# Patient Record
Sex: MALE
[De-identification: names, ages, dates, MRNs, and addresses within clinical notes are randomized per-mention and may not be internally consistent; named-entity substitution may affect disease eponyms.]

---

## 2017-03-18 ENCOUNTER — HOSPITAL ENCOUNTER (EMERGENCY)
Dept: HOSPITAL 14 - H.ER | Age: 67
LOS: 1 days | Discharge: HOME | End: 2017-03-19
Payer: COMMERCIAL

## 2017-03-18 VITALS — TEMPERATURE: 98.2 F | RESPIRATION RATE: 16 BRPM

## 2017-03-18 DIAGNOSIS — E78.5: ICD-10-CM

## 2017-03-18 DIAGNOSIS — R42: Primary | ICD-10-CM

## 2017-03-18 DIAGNOSIS — I10: ICD-10-CM

## 2017-03-18 DIAGNOSIS — K57.92: ICD-10-CM

## 2017-03-18 NOTE — ED PDOC
Syncope/Near Syncope/Dizzyness


Time Seen by Provider: 03/18/17 23:16


Chief Complaint (Nursing): Dizziness/Lightheaded


Chief Complaint (Provider): Dizzy


History Per: Patient


Additional Complaint(s): 


67 yo male, PMH of HTN, High Cholesterol and Diverticulitis, presents to ED 

with complaints of Dizziness since 7 pm. Pt reports that when he gets up to 

walk he feels off balance. 


no headache, no tinnitus, no visual changes, no nausea or vomiting, SOB or 

chest pain. 





Ambulatory with steady gait into ED room.





Past Medical History


Reviewed: Nursing Documentation, Vital Signs


Vital Signs: 





 Last Vital Signs











Temp  98.2 F   03/18/17 23:12


 


Pulse  68   03/18/17 23:12


 


Resp  16   03/18/17 23:12


 


BP  137/88   03/18/17 23:12


 


Pulse Ox  95   03/18/17 23:12














- Medical History


PMH: Diverticulitis, Gall Bladder Disease, HTN, Hyperlipidemia


   Denies: Chronic Kidney Disease





- Surgical History


Surgical History: Cholecystectomy





- Family History


Family History: States: Unknown Family Hx





- Living Arrangements


Living Arrangements: With Family





- Home Medications


Home Medications: 


 Ambulatory Orders











 Medication  Instructions  Recorded


 


Aspirin [Aspirin Chewable] 1 tab PO DAILY 03/19/17


 


Losartan Potassium [Cozaar] 1 tab PO DAILY 03/19/17


 


Meclizine [Meclizine*] 25 mg PO Q6 #30 tab 03/19/17


 


Simvastatin [Simvastatin] 100 mg PO DAILY 03/19/17














- Allergies


Allergies/Adverse Reactions: 


 Allergies











Allergy/AdvReac Type Severity Reaction Status Date / Time


 


No Known Allergies Allergy   Verified 08/27/14 23:17














Review of Systems


ROS Statement: Except As Marked, All Systems Reviewed And Found Negative


Neurological: Positive for: Dizziness





Physical Exam





- Reviewed


Nursing Documentation Reviewed: Yes


Vital Signs Reviewed: Yes





- Physical Exam


Appears: Positive for: Well, Non-toxic, No Acute Distress


Head Exam: Positive for: ATRAUMATIC, NORMAL INSPECTION, NORMOCEPHALIC


Skin: Positive for: Normal Color, Warm, DRY


Eye Exam: Positive for: EOMI, Normal appearance, PERRL


ENT: Positive for: Normal ENT Inspection


Neck: Positive for: Normal, Painless ROM


Cardiovascular/Chest: Positive for: Regular Rate, Rhythm


Respiratory: Positive for: CNT, Normal Breath Sounds


Gastrointestinal/Abdominal: Positive for: Normal Exam, Bowel Sounds, Soft


Back: Positive for: Normal Inspection


Extremity: Positive for: Normal ROM


Neurologic/Psych: Positive for: Alert, Oriented





- Laboratory Results


Result Diagrams: 


 03/18/17 00:11





 03/18/17 00:11





- ECG


O2 Sat by Pulse Oximetry: 95





Medical Decision Making


Medical Decision Making: 











EKG: NSR at 69 bpm, no acute ST Changes, as read by ED MD








CXR: NAD, as read by PAAylinC











Head CT IMPRESSION:


Normal head/brain CT.








CBC and COMP resulted WNL





Pt doing well on re-eval, no complaints of dizziness. Given RX for meclizine to 

use as needed and as directed





Disposition





- Clinical Impression


Clinical Impression: 


 Dizziness





- Patient ED Disposition


Is Patient to be Admitted: No





- Disposition


Disposition: Routine/Home


Disposition Time: 02:00


Condition: GOOD


Prescriptions: 


Meclizine [Meclizine*] 25 mg PO Q6 #30 tab


Instructions:  Dizziness (ED)

## 2017-03-19 VITALS — HEART RATE: 61 BPM | SYSTOLIC BLOOD PRESSURE: 125 MMHG | DIASTOLIC BLOOD PRESSURE: 85 MMHG

## 2017-03-19 LAB
ALBUMIN/GLOB SERPL: 1.2 {RATIO} (ref 1–2.1)
ALP SERPL-CCNC: 71 U/L (ref 38–126)
ALT SERPL-CCNC: 46 U/L (ref 21–72)
AST SERPL-CCNC: 34 U/L (ref 17–59)
BASOPHILS # BLD AUTO: 0.1 K/UL (ref 0–0.2)
BASOPHILS NFR BLD: 0.9 % (ref 0–2)
BILIRUB SERPL-MCNC: 0.7 MG/DL (ref 0.2–1.3)
BUN SERPL-MCNC: 16 MG/DL (ref 9–20)
CALCIUM SERPL-MCNC: 9 MG/DL (ref 8.4–10.2)
CHLORIDE SERPL-SCNC: 105 MMOL/L (ref 98–107)
CO2 SERPL-SCNC: 24 MMOL/L (ref 22–30)
EOSINOPHIL # BLD AUTO: 0.3 K/UL (ref 0–0.7)
EOSINOPHIL NFR BLD: 4.4 % (ref 0–4)
ERYTHROCYTE [DISTWIDTH] IN BLOOD BY AUTOMATED COUNT: 14.2 % (ref 11.5–14.5)
GLOBULIN SER-MCNC: 3.4 GM/DL (ref 2.2–3.9)
GLUCOSE SERPL-MCNC: 108 MG/DL (ref 75–110)
HCT VFR BLD CALC: 45.2 % (ref 35–51)
LYMPHOCYTES # BLD AUTO: 3.2 K/UL (ref 1–4.3)
LYMPHOCYTES NFR BLD AUTO: 51 % (ref 20–40)
MCH RBC QN AUTO: 27.8 PG (ref 27–31)
MCHC RBC AUTO-ENTMCNC: 32.8 G/DL (ref 33–37)
MCV RBC AUTO: 84.6 FL (ref 80–94)
MONOCYTES # BLD: 0.7 K/UL (ref 0–0.8)
MONOCYTES NFR BLD: 11.9 % (ref 0–10)
NEUTROPHILS # BLD: 2 K/UL (ref 1.8–7)
NEUTROPHILS NFR BLD AUTO: 31.8 % (ref 50–75)
NRBC BLD AUTO-RTO: 0.1 % (ref 0–0)
PLATELET # BLD: 192 K/UL (ref 130–400)
PMV BLD AUTO: 8.8 FL (ref 7.2–11.7)
POTASSIUM SERPL-SCNC: 4.2 MMOL/L (ref 3.6–5)
PROT SERPL-MCNC: 7.4 G/DL (ref 6.3–8.2)
SODIUM SERPL-SCNC: 136 MMOL/L (ref 132–148)
WBC # BLD AUTO: 6.2 K/UL (ref 4.8–10.8)

## 2017-03-19 NOTE — CT
EXAM:

  CT Head Without Intravenous Contrast.



CLINICAL HISTORY:

  66 years old, male; Pain; Headache; Other: Ha's dizzy; Patient HX: Ybarra's 

dizzy. HTN. High cholesterol; Additional info: Dizzy, off balance



TECHNIQUE:

  Axial computed tomography images of the head/brain without intravenous 

contrast.  This CT exam was performed using one or more of the following dose 

reduction techniques:  automated exposure control, adjustment of the mA and/or 

kV according to patient size, and/or use of iterative reconstruction technique.

  Coronal and sagittal reformatted images were created and reviewed.



COMPARISON:

  No relevant prior studies available.



FINDINGS:

  Brain:  Unremarkable.  No hemorrhage.  No significant white matter disease.  

No edema.

  Ventricles:  Unremarkable.  No ventriculomegaly.

  Bones/joints:  Unremarkable.  No acute fracture.

  Soft tissues:  Unremarkable.

  Sinuses:  Unremarkable as visualized.  No acute sinusitis.

  Mastoid air cells:  Unremarkable as visualized.  No mastoid effusion.



IMPRESSION:     

  Normal head/brain CT.

## 2017-03-19 NOTE — RAD
HISTORY:

med screening  



COMPARISON:

05/12/2014 



FINDINGS:



LUNGS:

No active pulmonary disease.



PLEURA:

No significant pleural effusion identified, no pneumothorax apparent.



CARDIOVASCULAR:

Normal.



OSSEOUS STRUCTURES:

No significant abnormalities.



VISUALIZED UPPER ABDOMEN:

Normal.



OTHER FINDINGS:

None.



IMPRESSION:

No active disease.

## 2017-03-20 NOTE — CARD
--------------- APPROVED REPORT --------------





EKG Measurement

Heart [id number]

RI [id number]

[id number]

[id number]

[id number]



<Conclusion>

Normal sinus rhythm

Minimal voltage criteria for LVH, may be normal variant

Borderline ECG

## 2017-04-02 VITALS — OXYGEN SATURATION: 95 %

## 2018-10-03 ENCOUNTER — HOSPITAL ENCOUNTER (EMERGENCY)
Dept: HOSPITAL 14 - H.ER | Age: 68
Discharge: HOME | End: 2018-10-03
Payer: COMMERCIAL

## 2018-10-03 VITALS
RESPIRATION RATE: 15 BRPM | OXYGEN SATURATION: 100 % | HEART RATE: 70 BPM | DIASTOLIC BLOOD PRESSURE: 87 MMHG | SYSTOLIC BLOOD PRESSURE: 139 MMHG

## 2018-10-03 VITALS — BODY MASS INDEX: 29 KG/M2

## 2018-10-03 VITALS — TEMPERATURE: 98.3 F

## 2018-10-03 DIAGNOSIS — I10: Primary | ICD-10-CM

## 2018-10-03 DIAGNOSIS — E78.5: ICD-10-CM

## 2018-10-03 LAB
ALBUMIN SERPL-MCNC: 4.1 G/DL (ref 3.5–5)
ALBUMIN/GLOB SERPL: 1.1 {RATIO} (ref 1–2.1)
ALT SERPL-CCNC: 50 U/L (ref 21–72)
APTT BLD: 35.7 SECONDS (ref 25.6–37.1)
AST SERPL-CCNC: 40 U/L (ref 17–59)
BASOPHILS # BLD AUTO: 0 K/UL (ref 0–0.2)
BASOPHILS NFR BLD: 0.4 % (ref 0–2)
BILIRUB UR-MCNC: NEGATIVE MG/DL
BUN SERPL-MCNC: 19 MG/DL (ref 9–20)
CALCIUM SERPL-MCNC: 9.4 MG/DL (ref 8.4–10.2)
COLOR UR: YELLOW
EOSINOPHIL # BLD AUTO: 0.2 K/UL (ref 0–0.7)
EOSINOPHIL NFR BLD: 2.7 % (ref 0–4)
ERYTHROCYTE [DISTWIDTH] IN BLOOD BY AUTOMATED COUNT: 14 % (ref 11.5–14.5)
GFR NON-AFRICAN AMERICAN: 47
GLUCOSE UR STRIP-MCNC: (no result) MG/DL
HGB BLD-MCNC: 14.7 G/DL (ref 12–18)
INR PPP: 1
LEUKOCYTE ESTERASE UR-ACNC: (no result) LEU/UL
LYMPHOCYTES # BLD AUTO: 3 K/UL (ref 1–4.3)
LYMPHOCYTES NFR BLD AUTO: 42.7 % (ref 20–40)
MCH RBC QN AUTO: 28 PG (ref 27–31)
MCHC RBC AUTO-ENTMCNC: 33.4 G/DL (ref 33–37)
MCV RBC AUTO: 83.9 FL (ref 80–94)
MONOCYTES # BLD: 0.9 K/UL (ref 0–0.8)
MONOCYTES NFR BLD: 12.4 % (ref 0–10)
NEUTROPHILS # BLD: 2.9 K/UL (ref 1.8–7)
NEUTROPHILS NFR BLD AUTO: 41.8 % (ref 50–75)
NRBC BLD AUTO-RTO: 0.1 % (ref 0–0)
PH UR STRIP: 6 [PH] (ref 5–8)
PLATELET # BLD: 240 K/UL (ref 130–400)
PMV BLD AUTO: 8.7 FL (ref 7.2–11.7)
PROT UR STRIP-MCNC: 30 MG/DL
PROTHROMBIN TIME: 11.4 SECONDS (ref 9.8–13.1)
RBC # BLD AUTO: 5.24 MIL/UL (ref 4.4–5.9)
RBC # UR STRIP: NEGATIVE /UL
SP GR UR STRIP: 1.01 (ref 1–1.03)
URINE CLARITY: CLEAR
UROBILINOGEN UR-MCNC: 2 MG/DL (ref 0.2–1)
WBC # BLD AUTO: 7 K/UL (ref 4.8–10.8)

## 2018-10-03 NOTE — ED PDOC
HPI: Hypertension/Hypotension


Time Seen by Provider: 10/03/18 19:50


Chief Complaint (Nursing): High Blood Pressure


Chief Complaint (Provider): High Blood Pressure


History Per: Patient


History/Exam Limitations: no limitations


Onset/Duration Of Symptoms: Intermittent Episodes (yesterday for x1 hr)


Current Symptoms Are (Timing): Intermittent Episodes


Additional Complaint(s): 


67 year old - male with hx of HTN and hyperlipidemia presents to 

the ED for evaluation of possible high blood pressure. Yesterday he reports 

feeling a warm sensation to the back of his head associated with heaviness, 

which resolved after an hour and Aspirin. Patient reports that he works as an 

[employer] , and felt well throughout the day but thought he should get check. 

Denies chest pain, shortness of breath, nausea, vomiting, and dizziness.





PMD: [place]





Past Medical History


Reviewed: Historical Data


Vital Signs: 


                                Last Vital Signs











Temp  98.3 F   10/03/18 19:02


 


Pulse  70   10/03/18 19:02


 


Resp  16   10/03/18 19:02


 


BP  150/90   10/03/18 19:02


 


Pulse Ox  99   10/03/18 19:02














- Medical History


PMH: Diverticulitis, Gall Bladder Disease, HTN, Hyperlipidemia


   Denies: Chronic Kidney Disease





- Surgical History


Surgical History: Cholecystectomy





- Family History


Family History: States: Unknown Family Hx





- Social History


Current smoker - smoking cessation education provided: No


Alcohol: None


Drugs: Denies





- Home Medications


Home Medications: 


                                Ambulatory Orders











 Medication  Instructions  Recorded


 


Aspirin [Aspirin Chewable] 1 tab PO DAILY 03/19/17


 


Losartan Potassium [Cozaar] 1 tab PO DAILY 03/19/17


 


Meclizine [Meclizine*] 25 mg PO Q6 #30 tab 03/19/17


 


Simvastatin 100 mg PO DAILY 03/19/17














- Allergies


Allergies/Adverse Reactions: 


                                    Allergies











Allergy/AdvReac Type Severity Reaction Status Date / Time


 


No Known Allergies Allergy   Verified 05/03/17 09:32














Review of Systems


ROS Statement: Except As Marked, All Systems Reviewed And Found Negative


Constitutional: Positive for: Other (warm sensation to back of head with heav

iness)


Cardiovascular: Negative for: Chest Pain


Respiratory: Negative for: Shortness of Breath


Gastrointestinal: Negative for: Nausea, Vomiting


Neurological: Negative for: Dizziness





Physical Exam





- Reviewed


Nursing Documentation Reviewed: Yes


Vital Signs Reviewed: Yes





- Physical Exam


Appears: Positive for: No Acute Distress


Head Exam: Positive for: ATRAUMATIC, NORMOCEPHALIC


Skin: Positive for: Normal Color, Warm, Dry.  Negative for: Rash


Eye Exam: Positive for: Normal appearance, EOMI, PERRL


ENT: Positive for: Normal ENT Inspection


Neck: Positive for: Normal, Painless ROM, Supple


Cardiovascular/Chest: Positive for: Regular Rate, Rhythm


Respiratory: Positive for: Normal Breath Sounds.  Negative for: Accessory Muscle

 Use, Respiratory Distress


Gastrointestinal/Abdominal: Positive for: Normal Exam, Soft.  Negative for: 

Tenderness


Back: Positive for: Normal Inspection


Extremity: Positive for: Normal ROM.  Negative for: Pedal Edema, Calf Tenderness


Neurologic/Psych: Positive for: Alert, Oriented (x3).  Negative for: 

Motor/Sensory Deficits





- Laboratory Results


Result Diagrams: 


                                 10/03/18 20:34





                                 10/03/18 20:34





- ECG


O2 Sat by Pulse Oximetry: 99 (RA)


Pulse Ox Interpretation: Normal





Medical Decision Making


Medical Decision Making: 


Time: 1950


Initial Impression: 67 year old male with presentation for high blood pressure


Initial Plan:


--EKG


--CMP


--Urine dip


--CBC with differential


--PTT / PT


--Urinalysis





2205 Labs reviewed with no clinically significant abnormalities. Pt remains 

asymptomatic in the ED and is to follow up with [facility]. Stable for 

d/c with diagnosis of htn.





------------------------------------------------

---------------------------------------------------------------------


Scribe Attestation:


Documented by Elina Barksdale, acting as a scribe for Christian Stout MD.


Provider Scribe Attestation:


All medical record entries made by the Scribe were at my direction and 

personally dictated by me. I have reviewed the chart and agree that the record 

accurately reflects my personal performance of the history, physical exam, 

medical decision making, and the department course for this patient. I have also

 personally directed, reviewed, and agree with the discharge instructions and 

disposition.





Disposition





- Clinical Impression


Clinical Impression: 


 Hypertension








- Patient ED Disposition


Is Patient to be Admitted: No


Counseled Patient/Family Regarding: Studies Performed, Diagnosis, Need For 

Followup





- Disposition


Disposition: Routine/Home


Disposition Time: 22:05


Condition: STABLE


Instructions:  High Blood Pressure in Adults


Forms:  [facility] (English)


Print Language: Hebrew

## 2018-10-04 NOTE — CARD
--------------- APPROVED REPORT --------------





Date of service: 10/03/2018



EKG Measurement

Heart [id number]

[id number]

[id number]

[id number]

[id number]



<Conclusion>

Normal sinus rhythm

Normal ECG